# Patient Record
Sex: FEMALE | Race: WHITE | NOT HISPANIC OR LATINO | Employment: OTHER | URBAN - METROPOLITAN AREA
[De-identification: names, ages, dates, MRNs, and addresses within clinical notes are randomized per-mention and may not be internally consistent; named-entity substitution may affect disease eponyms.]

---

## 2017-09-18 ENCOUNTER — PATIENT OUTREACH (OUTPATIENT)
Dept: ADMINISTRATIVE | Facility: HOSPITAL | Age: 72
End: 2017-09-18

## 2017-09-18 NOTE — LETTER
September 18, 2017    Joy Roldan  [address] [place] [address]             [facility]  [address]  [place] [address]  Phone: [phone] Dear Ms. Roldan:    [facility] is committed to your overall health and would like to ensure that you are up to date on all of your health maintenance testing.  Your insurance company ([employer]) has informed us that you have Dr. Tate listed as your primary care provider.    If Dr. Tate is not your primary care provider, please contact your insurance company  and have them update their records with the correct provider.     You may also be due for immunizations, Mammogram, Colonoscopy (Colorectal screening), and Osteoporosis screening (DEXA SCAN).    Otherwise,  Please schedule an appointment with Dr. Tate by calling [phone] at your earliest convenience.      If you have any questions or concerns, please don't hesitate to call.    Sincerely,    Deisi Hardwick  Clinical Care Coordinator  [facility] [address] [facility] [address]  [place] [address]  Phone: [phone]   Fax: [phone]

## 2017-09-19 NOTE — PROGRESS NOTES
[employer] carebook over 12 month report.  Letter mailed to patient.  Needs to  Notify [employer] correct PCP. Several attempts. Last seen Lea 2014.

## 2018-03-01 ENCOUNTER — TELEPHONE (OUTPATIENT)
Dept: FAMILY MEDICINE | Facility: CLINIC | Age: 73
End: 2018-03-01

## 2018-05-25 ENCOUNTER — PES CALL (OUTPATIENT)
Dept: ADMINISTRATIVE | Facility: CLINIC | Age: 73
End: 2018-05-25

## 2018-08-01 ENCOUNTER — PES CALL (OUTPATIENT)
Dept: ADMINISTRATIVE | Facility: CLINIC | Age: 73
End: 2018-08-01

## 2018-10-31 PROBLEM — E74.39 GLUCOSE INTOLERANCE: Status: ACTIVE | Noted: 2018-10-31

## 2018-10-31 PROBLEM — T84.59XA INFECTION OF PROSTHETIC SHOULDER JOINT: Status: ACTIVE | Noted: 2018-10-31

## 2018-10-31 PROBLEM — Z96.619 INFECTION OF PROSTHETIC SHOULDER JOINT: Status: ACTIVE | Noted: 2018-10-31

## 2018-11-14 ENCOUNTER — TELEPHONE (OUTPATIENT)
Dept: INFECTIOUS DISEASES | Facility: CLINIC | Age: 73
End: 2018-11-14

## 2018-11-14 NOTE — TELEPHONE ENCOUNTER
Received phone call from Belen with [employer] concerning Vanco Trough 34.3 drawn this morning on @ 7:30 on patient prior to her 8:00 am dose. Level was repeated this morning, as a trough was drawn yesterday by nurse who stated patient did not dose at her scheduled time and level was 31.8. This SN spoke with Eileen at [facility] to advise that Dr Gómez has never received and lab results since start of care on 11/2. She stated they only had orders to fax to [employer]. This SN read the discharge orders written by Dr Gómez with weekly lab work to be faxed to her office at [phone]. Labs faxed today were sent to Dr Gómez @ [phone]. This SN once again contacted [facility] to advise that Dr Gómez is in [place], and not at [facility]. She states the nurse went back to patients home earlier this afternoon to obtain another set of scheduled labs including CBC, CMP, ESR, CRP. Spoke with Eileen at [facility] who states they have the results from those labs at [place] and she will send them once she gets them.

## 2018-11-14 NOTE — TELEPHONE ENCOUNTER
Patient discharged on IV antibiotics. [facility] agency did not fax results to clinic for evaluation. Had abnormal vancomycin trough with elevated creatinine consistent with PARAM on 11/12 and results only reported today as a repeat level was still elevated. Plan as below:   · Discontinue vancomycin.   · Awaiting results of repeat creatinine level.   · Will give 0.9  ml today. Repeat creatinine on Friday stat and if elevated to hospital for further management.

## 2018-11-15 ENCOUNTER — TELEPHONE (OUTPATIENT)
Dept: INFECTIOUS DISEASES | Facility: CLINIC | Age: 73
End: 2018-11-15

## 2018-11-15 ENCOUNTER — TELEPHONE (OUTPATIENT)
Dept: ADMINISTRATIVE | Facility: CLINIC | Age: 73
End: 2018-11-15

## 2018-11-15 NOTE — TELEPHONE ENCOUNTER
Weekly laboratory work up reviewed.     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      Plan:  -Ceftriaxone 2 gm IV daily.   -vancomycin discontinued on 11/14.  -EOC: 12/11/18 (6 weeks)  -Repeat BMP on 11/16.  -Weekly CBC, CMP, ESR, CRP

## 2018-11-15 NOTE — TELEPHONE ENCOUNTER
----- Message from Abbie Boggs sent at 11/14/2018  3:49 PM CST -----  Type: Needs Medical Advice    Who Called:  [facility] Call Back Number: [phone]  Additional Information: Needs to speak Zakia concerning the orders that she gave her 15 minutes ago. Please call to advise.

## 2018-11-15 NOTE — TELEPHONE ENCOUNTER
----- Message from Nicole East sent at 11/15/2018  9:28 AM CST -----  Danielle [facility]  [phone] asking to discuss infusion from last night

## 2018-11-15 NOTE — TELEPHONE ENCOUNTER
Spoke with Danielle who stated that Mrs Roldan did not receive her NS, that was ordered to be infused last night, until 7:30 am today secondary to the fluids being delivered to patients home and no one in their office being notified that the fluids were en-route to be delivered, so that a nurse could be sent to the home. Spoke with Belen at [facility] this morning who states she was given Danielle's cell phone, which she verbalized during our conversation, and when she called last pm to notify  en-route with the fluids, there was no answer. Spoke with Dr Gómez to advise of above situation and to inquire if she wanted to change her previous orders. Dr Gómez instructed to leave orders as previously written. Danielle contacted and instructed to proceed with additional orders which this SN verbalized are to repeat Creatinine STAT in am on 11/16, and if level is still elevated, patient to be instructed to go to hospital for further management. Understanding verbalized. Patient also aware of plan and verbalized understanding, as this SN spoke with her last night to advise that the Vancomycin was being discontinued, and that her Creatinine level was elevated, so Dr Gómez was ordering 500 ml NS IV to be given last night and then a repeat Creatinine level to be obtained STAT in am on 11/16. Instructed patient that if level still elevated, Dr Gómez is ordering she return to the hospital for further management. Will continue to monitor.

## 2018-11-16 ENCOUNTER — TELEPHONE (OUTPATIENT)
Dept: INFECTIOUS DISEASES | Facility: CLINIC | Age: 73
End: 2018-11-16

## 2018-11-16 DIAGNOSIS — N17.9 AKI (ACUTE KIDNEY INJURY): ICD-10-CM

## 2018-11-16 DIAGNOSIS — T36.8X5A VANCOMYCIN-INDUCED NEPHROTOXICITY: ICD-10-CM

## 2018-11-16 DIAGNOSIS — N14.19 VANCOMYCIN-INDUCED NEPHROTOXICITY: ICD-10-CM

## 2018-11-19 ENCOUNTER — TELEPHONE (OUTPATIENT)
Dept: INFECTIOUS DISEASES | Facility: CLINIC | Age: 73
End: 2018-11-19

## 2018-11-19 NOTE — TELEPHONE ENCOUNTER
----- Message from Norma Rodriguez sent at 11/19/2018  1:56 PM CST -----  Contact: Nimo with [facility]  Nimo with [facility] calling regarding fax sent about labs. Please call Danielle when received at [phone]. Thanks!

## 2018-11-19 NOTE — TELEPHONE ENCOUNTER
Spoke with Danielle and advised that we received fax-results sent to Dr Gómez. Per Dr Gómez, continue holding Vancomycin, and recheck CBC, CMP, ESR, CRP on 11/26

## 2018-11-20 ENCOUNTER — TELEPHONE (OUTPATIENT)
Dept: INFECTIOUS DISEASES | Facility: CLINIC | Age: 73
End: 2018-11-20

## 2018-11-20 NOTE — TELEPHONE ENCOUNTER
----- Message from Gonzalo Ansari sent at 11/20/2018  3:01 PM CST -----  Contact: Edu -   Type: Needs Medical Advice    Who Called:  Edu  [facility] Call Back Number: [phone]  Additional Information: Caller is requesting a sooner appointment than previously scheduled 12/10/18. Please call to advise. Thanks!

## 2018-11-21 ENCOUNTER — TELEPHONE (OUTPATIENT)
Dept: INFECTIOUS DISEASES | Facility: CLINIC | Age: 73
End: 2018-11-21

## 2018-11-21 NOTE — TELEPHONE ENCOUNTER
----- Message from Jonh Calvert sent at 11/20/2018  3:33 PM CST -----  Contact: Richard hill/Sabine Ramos is calling to make sure that pt is not longer on IV or antibiotics,pls call back and advise  Call Back#[phone]  Thanks

## 2018-11-21 NOTE — TELEPHONE ENCOUNTER
Spoke with Richard yesterday evening to advise that Dr Gómez had only discontinued the Vancomycin, and patient is currently still taking the Ceftriaxone. Richard states patient refused delivery of meds, as she states she was told to stop both abx by home health. This SN placed call to Danielle to see who told patient to stop the Ceftriaxone-Danielle stated they need updated orders from [facility] to continue the Ceftriaxone. Spoke with Teri today who states orders were sent to [facility] on 11/14 d/c'ing the Vanco and to continue the Ceftriaxone. She states she will refax those orders today. This SN also gave verbal order to Danielle to have the scheduled labs for 11/26 done in the am in case patient needs more IV fluids, as she has not received the past two infusions on the day ordered, and were the next day receiving secondary to fluids arriving late and no nurse to go out and do infusion.

## 2018-11-27 ENCOUNTER — TELEPHONE (OUTPATIENT)
Dept: INFECTIOUS DISEASES | Facility: CLINIC | Age: 73
End: 2018-11-27

## 2018-11-27 NOTE — TELEPHONE ENCOUNTER
----- Message from Xiomy Hoover sent at 11/27/2018 12:16 PM CST -----  Contact: Richard Rae want to inform office patient is not taking cefTRIAXone, any questions please call back at [phone]

## 2018-11-28 ENCOUNTER — TELEPHONE (OUTPATIENT)
Dept: INFECTIOUS DISEASES | Facility: CLINIC | Age: 73
End: 2018-11-28

## 2018-11-28 DIAGNOSIS — Z91.199 NON-ADHERENCE TO MEDICAL TREATMENT: ICD-10-CM

## 2018-11-28 NOTE — TELEPHONE ENCOUNTER
Weekly laboratory work up reviewed.     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     Plan:  -Message sent to Dr. Rose.   -Will need PICC removal, after discussion with Dr. Rose, as he is also not flushing line and is at risk for complications.

## 2018-11-29 ENCOUNTER — TELEPHONE (OUTPATIENT)
Dept: INFECTIOUS DISEASES | Facility: CLINIC | Age: 73
End: 2018-11-29

## 2018-11-29 NOTE — TELEPHONE ENCOUNTER
Call placed to Dr. Rose. Patient non adherent with medical therapy. Remove PICC today. Orders sent to [facility].

## 2018-12-03 ENCOUNTER — TELEPHONE (OUTPATIENT)
Dept: INFECTIOUS DISEASES | Facility: CLINIC | Age: 73
End: 2018-12-03

## 2018-12-03 DIAGNOSIS — T84.59XA INFECTION OF PROSTHETIC SHOULDER JOINT, INITIAL ENCOUNTER: Primary | ICD-10-CM

## 2018-12-03 DIAGNOSIS — Z96.619 INFECTION OF PROSTHETIC SHOULDER JOINT, INITIAL ENCOUNTER: Primary | ICD-10-CM

## 2018-12-03 RX ORDER — DOXYCYCLINE 100 MG/1
100 CAPSULE ORAL 2 TIMES DAILY
Qty: 56 CAPSULE | Refills: 0 | Status: SHIPPED | OUTPATIENT
Start: 2018-12-03 | End: 2018-12-31

## 2018-12-03 NOTE — TELEPHONE ENCOUNTER
"Patient has agreed to oral antibiotics. Will send prescription for doxycycline 100 mg BID for 4 weeks. Unclear if patient will adhere to therapy as she has already stated she will discontinued "if it makes me sick". High risk for relapse.   "

## 2018-12-03 NOTE — TELEPHONE ENCOUNTER
Spoke with Tangela and gave verbal order to d/c weekly labs. She stated even though patient has no IV access and patient stopped taking the Rocephin without a doctor's orders, she still needed d/c orders.

## 2018-12-03 NOTE — TELEPHONE ENCOUNTER
----- Message from Brisa Andrew sent at 12/3/2018 11:30 AM CST -----  Contact: Tangela  with [facility]  Type: Needs Medical Advice    Who Called:  Tangela with [facility]  Best Call Back Number:  [phone] (please leave voice mail with yes or no if she cannot answer the phone)  Additional Information: Patient's PIC was discontinued last week, so they need an order to discontinue all weekly labs (for the antibiotics she was taking).    Please call to advise  Thank you

## 2019-01-14 ENCOUNTER — TELEPHONE (OUTPATIENT)
Dept: ADMINISTRATIVE | Facility: CLINIC | Age: 74
End: 2019-01-14

## 2019-02-23 PROBLEM — E11.9 TYPE 2 DIABETES MELLITUS: Status: ACTIVE | Noted: 2019-02-23

## 2019-06-06 ENCOUNTER — PES CALL (OUTPATIENT)
Dept: ADMINISTRATIVE | Facility: CLINIC | Age: 74
End: 2019-06-06

## 2019-12-16 NOTE — TELEPHONE ENCOUNTER
Patient's home health nurse called office today to request med refills and clarification that patient is to continue with doses that were changed by nurse practitioner 12/11.( Angy Smith NP)  Patient states that her Lasix was changed to 20mg 2 x a day (was 1 tab daily) and Metoprolol 50mg tabs 2 tabs 2 x a day ( was 1 every 8 hours)    Home health nurse is collecting BMP today 12/16    Do you want to wait to see BMP results to make decision related to medications ? There was clarification in note in relation to Lasix 40mg 2 x a day. Patient is curretnly taking Lasix 20mg tab 2 x a day. No mention of the metoprolol dose change.    Please advise on how to proceed    Thank you  Marine Navas RN  [facility] Triage Nurse     Weekly laboratory work up reviewed.     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      Plan:  -Ceftriaxone 2 gm IV daily.   -EOC: 12/11/18 (6 weeks)  -Weekly CBC, CMP, ESR, CRP  -Repeat fluid bolus: 0.9 NSS 50 ml and repeat above labs on 11/19